# Patient Record
Sex: FEMALE | Race: BLACK OR AFRICAN AMERICAN | Employment: FULL TIME | URBAN - METROPOLITAN AREA
[De-identification: names, ages, dates, MRNs, and addresses within clinical notes are randomized per-mention and may not be internally consistent; named-entity substitution may affect disease eponyms.]

---

## 2019-06-11 PROBLEM — R50.9 FEVER: Status: ACTIVE | Noted: 2019-06-11

## 2019-06-11 PROBLEM — D72.819 LEUKOPENIA: Status: ACTIVE | Noted: 2019-06-11

## 2019-06-11 PROBLEM — D69.6 THROMBOCYTOPENIA (HCC): Status: ACTIVE | Noted: 2019-06-11

## 2019-06-11 PROBLEM — R74.01 ELEVATED TRANSAMINASE LEVEL: Status: ACTIVE | Noted: 2019-06-11

## 2019-08-07 ENCOUNTER — OFFICE VISIT (OUTPATIENT)
Dept: ONCOLOGY | Age: 44
End: 2019-08-07

## 2019-08-07 ENCOUNTER — HOSPITAL ENCOUNTER (OUTPATIENT)
Dept: ONCOLOGY | Age: 44
Discharge: HOME | End: 2019-08-07

## 2019-08-07 VITALS
DIASTOLIC BLOOD PRESSURE: 67 MMHG | HEIGHT: 69 IN | TEMPERATURE: 98.2 F | OXYGEN SATURATION: 98 % | RESPIRATION RATE: 18 BRPM | SYSTOLIC BLOOD PRESSURE: 97 MMHG | HEART RATE: 77 BPM | WEIGHT: 219 LBS | BODY MASS INDEX: 32.44 KG/M2

## 2019-08-07 DIAGNOSIS — D72.819 CHRONIC LEUKOPENIA: ICD-10-CM

## 2019-08-07 DIAGNOSIS — D50.9 IRON DEFICIENCY ANEMIA, UNSPECIFIED IRON DEFICIENCY ANEMIA TYPE: Chronic | ICD-10-CM

## 2019-08-07 DIAGNOSIS — D50.0 IRON DEFICIENCY ANEMIA DUE TO CHRONIC BLOOD LOSS: Primary | ICD-10-CM

## 2019-08-07 DIAGNOSIS — D61.818 PANCYTOPENIA (HCC): ICD-10-CM

## 2019-08-07 DIAGNOSIS — D69.6 THROMBOCYTOPENIA (HCC): ICD-10-CM

## 2019-08-07 LAB
BASOPHILS # BLD: 0 K/UL (ref 0–0.1)
BASOPHILS NFR BLD: 0 % (ref 0–2)
DIFFERENTIAL METHOD BLD: ABNORMAL
EOSINOPHIL # BLD: 0.1 K/UL (ref 0–0.4)
EOSINOPHIL NFR BLD: 2 % (ref 0–5)
ERYTHROCYTE [DISTWIDTH] IN BLOOD BY AUTOMATED COUNT: 12.9 % (ref 11.6–14.5)
HCT VFR BLD AUTO: 33.6 % (ref 35–45)
HGB BLD-MCNC: 11.4 G/DL (ref 12–16)
LYMPHOCYTES # BLD: 0.7 K/UL (ref 0.9–3.6)
LYMPHOCYTES NFR BLD: 21 % (ref 21–52)
MCH RBC QN AUTO: 28 PG (ref 24–34)
MCHC RBC AUTO-ENTMCNC: 33.9 G/DL (ref 31–37)
MCV RBC AUTO: 82.6 FL (ref 74–97)
MONOCYTES # BLD: 0.7 K/UL (ref 0.05–1.2)
MONOCYTES NFR BLD: 22 % (ref 3–10)
NEUTS SEG # BLD: 1.7 K/UL (ref 1.8–8)
NEUTS SEG NFR BLD: 55 % (ref 40–73)
PLATELET # BLD AUTO: 212 K/UL (ref 135–420)
PMV BLD AUTO: 10.6 FL (ref 9.2–11.8)
RBC # BLD AUTO: 4.07 M/UL (ref 4.2–5.3)
WBC # BLD AUTO: 3.1 K/UL (ref 4.6–13.2)

## 2019-08-07 NOTE — PROGRESS NOTES
Hematology/Oncology  Consultation Note    Name: Benjamín Ndiaye  Date: 2019  : 1975    PCP: Dr. Jani Kaplan    Ms. Vandana Oliveira is a 41 y.o.  female who was referred back to this clinic for further assessment of recently diagnosed pancytopenia donor hospitalization in 2018. She previously had been followed in this clinic for iron deficiency anemia. C  Subjective:     Ms. Vandana Oliveira is a 44-year-old -American woman who has a history of inflammatory bowel disease and iron deficiency anemia. She was last seen in this clinic in 2013. She states that she had been doing well until early 2018 when she developed fever and headaches. She also had pain in her upper extremities. In the course of her hospitalization she was found to have a WBC count of about 2.2 with a hemoglobin that declined to 10.2 g/dL with hematocrit of 31.6. Her platelet counts ranged between 81,000-92,000 during her recent hospital stay. Since she had not been seen in the hematology/oncology clinic in over 5 years she was referred back for further assessment of her pancytopenia. Today she is complaining of progressive fatigue and weakness. Currently she is not taking any over-the-counter iron supplements. She did have a lumbar puncture during her hospitalization and states that she remain out of work for almost a month following that due to recurrent headaches. She has no other complaints or concerns.      Past Medical History:   Diagnosis Date    Anemia NEC     iron transfusion by Dr. Robbins [place] disease ([address]     GERD (gastroesophageal reflux disease)     Headache(784.0)      Past Surgical History:   Procedure Laterality Date    HX OTHER SURGICAL      Ablation gyn    HX TUBAL LIGATION       Social History     Socioeconomic History    Marital status:      Spouse name: Not on file    Number of children: Not on file    Years of education: Not on file    Highest education level: Not on file Occupational History    Not on file   Social Needs    Financial resource strain: Not on file    Food insecurity:     Worry: Not on file     Inability: Not on file    Transportation needs:     Medical: Not on file     Non-medical: Not on file   Tobacco Use    Smoking status: Never Smoker    Smokeless tobacco: Never Used   Substance and Sexual Activity    Alcohol use: Not Currently     Comment: rare social    Drug use: No    Sexual activity: Yes     Birth control/protection: Surgical   Lifestyle    Physical activity:     Days per week: Not on file     Minutes per session: Not on file    Stress: Not on file   Relationships    Social connections:     Talks on phone: Not on file     Gets together: Not on file     Attends [employer] service: Not on file     Active member of club or organization: Not on file     Attends meetings of clubs or organizations: Not on file     Relationship status: Not on file    Intimate partner violence:     Fear of current or ex partner: Not on file     Emotionally abused: Not on file     Physically abused: Not on file     Forced sexual activity: Not on file   Other Topics Concern    Not on file   Social History Narrative    Not on file     Family History   Problem Relation Age of Onset    Diabetes Paternal Grandmother      Current Outpatient Medications   Medication Sig Dispense Refill    diclofenac (VOLTAREN) 1 % gel Apply 4 g to affected area four (4) times daily. 4 g 1    naproxen EC (NAPROSYN EC) 500 mg EC tablet Take 1 Tab by mouth two (2) times a day. 30 Tab 1    pantoprazole (PROTONIX) 40 mg tablet Take 1 Tab by mouth daily. 30 Tab 1    valACYclovir (VALTREX) 1 gram tablet Take 1,000 mg by mouth daily.  linaclotide (LINZESS) 290 mcg cap capsule Take 290 mcg by mouth daily as needed.          Review of Systems  General ROS:The patient has complaints of a mild to moderate degree of fatigue and some weakness  Psychological ROS: patient denies having any psychological symptoms such as hallucinations, depression or anxiety. Ophthalmic ROS:the patient denies having any visual impairment or eye discomfort. ENT ROS: there are no abnormalities reported. Allergy and Immunology ROS:the patient denies having any seasonal allergies or allergies to medications other than those already outlined above. Hematological and Lymphatic ROS: the patient denies having any bruising, bleeding or lymphadenopathy. Endocrine ROS: the patient denies having any heat or cold intolerance. There is no history of diabetes or thyroid disorders. Breast ROS: the patient denies having any history of breast mass, nipple discharge, or lumps. Respiratory ROS:the patient denies having any cough, shortness of breath, or dyspnea on exertion. Cardiovascular ROS: there are no complaints of chest pain, palpitations, chest pounding, or dyspnea on exertion. Gastrointestinal ROS: the patient denies having nausea, emesis, diarrhea, constipation, or blood in the stool. Genito-Urinary ROS: the patient denies having urinary urgency, frequency, or dysuria. Musculoskeletal ROS: with the exception of mild arthralgias the patient has no other musculoskeletal complaints. Neurological ROS: the patient denies having any numbness, tingling, or neurologic deficits. Dermatological ROS:patient denies having any unexplained rash, skin ulcerations, or hives. Objective:     Visit Vitals  BP 97/67   Pulse 77   Temp 98.2 °F (36.8 °C) (Oral)   Resp 18   Ht 5' 9\" (1.753 m)   Wt 99.3 kg (219 lb)   SpO2 98%   BMI 32.34 kg/m²       Physical Exam:   Gen. Appearance: The patient is in no acute distress. Skin: There is no bruise or rash. HEENT: The exam is unremarkable. Neck: Supple without lymphadenopathy or thyromegaly. Lungs: Clear to auscultation and percussion; there are no wheezes or rhonchi. Heart: Regular rate and rhythm; there are no murmurs, gallops, or rubs. Abdomen:  Bowel sounds are present and normal. There is no guarding, tenderness, or hepatosplenomegaly. Extremities: There is no clubbing, cyanosis, or edema. Neurologic: There are no focal neurologic deficits. Lymphatics: There is no palpable peripheral lymphadenopathy. Musculoskeletal: The patient has full range of motion at all joints. There is no joint tenderness or swelling.     Lab data:      Results for orders placed or performed during the hospital encounter of 06/11/19   CULTURE, BLOOD   Result Value Ref Range    Blood Culture Result No Growth at 5 days     CULTURE, BLOOD   Result Value Ref Range    Blood Culture Result No Growth at 5 days     CULTURE, CSF W GRAM STAIN   Result Value Ref Range    GRAM STAIN No WBC's Seen  No Organisms Seen        Culture result No Growth After 5 Days     CULTURE, URINE   Result Value Ref Range    Culture result No Growth     RESPIRATORY VIRAL PANEL, PCR   Result Value Ref Range    RSV RNA, Qualitative PCR NOT DETECTED      Source [place]      Influenza A RNA, PCR NOT DETECTED      Influenza B RNA, PCR NOT DETECTED      Parainfluenza 1 RNA NOT DETECTED      Parainfluenza 2 RNA NOT DETECTED      Parainfluenza 3 RNA NOT DETECTED      Parainfluenza 4 RNA NOT DETECTED      Adenovirus DNA, QL PCR NOT DETECTED      Human Metapneumovirus RNA, QL NOT DETECTED     CBC WITH AUTOMATED DIFF   Result Value Ref Range    WBC 2.2 (L) 4.0 - 11.0 1000/mm3    NEUTROPHILS 54.3 34 - 64 %    LYMPHOCYTES 25.7 (L) 28 - 48 %    RBC 4.50 3.60 - 5.20 M/uL    MONOCYTES 5.7 1 - 13 %    HGB 12.4 (L) 13.0 - 17.2 gm/dl    HCT 37.6 37.0 - 50.0 %    MCV 83.6 80.0 - 98.0 fL    BAND NEUTROPHILS 11.4 (H) 0 - 11 %    MCH 27.6 25.4 - 34.6 pg    MCHC 33.0 30.0 - 36.0 gm/dl    PLATELET 83 (L) 079 - 450 1000/mm3    UNIDENTIFIED MONONUCLEAR CELL 2.9 (H) 0 - 0 %    MPV 11.7 (H) 6.0 - 10.0 fL    RDW-SD 36.9 36.4 - 46.3      Poikilocytosis 1      NRBC 0 0 - 0      IMMATURE GRANULOCYTES 0.9 0.0 - 3.0 %    Elliptocytes 1      EOSINOPHILS 0.0 0 - 5 %    BASOPHILS 0.5 0 - 3 %    Smudge cells 13.3      Ovalocytes-DIF 1      PLATELET COMMENTS DECREASED      Large platelets FEW     METABOLIC PANEL, COMPREHENSIVE   Result Value Ref Range    Sodium 133 (L) 136 - 145 mEq/L    Potassium 3.9 3.5 - 5.1 mEq/L    Chloride 101 98 - 107 mEq/L    CO2 24 21 - 32 mEq/L    Glucose 96 74 - 106 mg/dl    BUN 10 7 - 25 mg/dl    Creatinine 0.8 0.6 - 1.3 mg/dl    GFR est AA >60.0      GFR est non-AA >60      Calcium 8.9 8.5 - 10.1 mg/dl    AST (SGOT) 92 (H) 15 - 37 U/L    ALT (SGPT) 46 12 - 78 U/L    Alk. phosphatase 92 45 - 117 U/L    Bilirubin, total 0.5 0.2 - 1.0 mg/dl    Protein, total 8.8 (H) 6.4 - 8.2 gm/dl    Albumin 3.9 3.4 - 5.0 gm/dl    Anion gap 9 5 - 15 mmol/L   LACTIC ACID   Result Value Ref Range    Lactic Acid 1.1 0.4 - 2.0 mmol/L   PROCALCITONIN   Result Value Ref Range    PROCALCITONIN 0.13 0.00 - 0.50 ng/ml   CELL COUNT, CSF   Result Value Ref Range    CSF TUBE NO. #1      CSF APPEARANCE CLEAR      CSF COLOR COLORLESS      CSF WBCS 1 0 - 10      CSF RBCS 0 0 - 10     CELL COUNT, CSF   Result Value Ref Range    CSF TUBE NO.  #4      CSF APPEARANCE CLEAR      CSF COLOR COLORLESS      CSF WBCS 0 0 - 10      CSF RBCS 0 0 - 10     GLUCOSE, CSF   Result Value Ref Range    Glucose,CSF 52 40 - 70 mg/dl   PROTEIN, CSF   Result Value Ref Range    Protein,CSF 38.9 15.0 - 45.0 mg/dl   HSV 1/2 PCR, CSF   Result Value Ref Range    Source CSF      HSV 1 DNA Not Detected Not Detected      HSV 2 DNA Not Detected Not Detected     LACTIC ACID   Result Value Ref Range    Lactic Acid 1.0 0.4 - 2.0 mmol/L   LACTIC ACID   Result Value Ref Range    Lactic Acid 1.2 0.4 - 2.0 mmol/L   CBC WITH AUTOMATED DIFF   Result Value Ref Range    WBC 1.6 (L) 4.0 - 11.0 1000/mm3    NEUTROPHILS 65.4 (H) 34 - 64 %    LYMPHOCYTES 24.0 (L) 28 - 48 %    RBC 3.74 3.60 - 5.20 M/uL    MONOCYTES 3.8 1 - 13 %    HGB 10.5 (L) 13.0 - 17.2 gm/dl    HCT 31.6 (L) 37.0 - 50.0 %    MCV 84.5 80.0 - 98.0 fL    BAND NEUTROPHILS 4.8 0 - 11 %    MCH 28.1 25.4 - 34.6 pg    METAMYELOCYTES 1.0 (H) 0 - 0 %    MCHC 33.2 30.0 - 36.0 gm/dl    MYELOCYTES 1.0 (H) 0 - 0 %    PLATELET 73 (L) 966 - 450 1000/mm3    MPV 11.4 (H) 6.0 - 10.0 fL    RDW-SD 37.7 36.4 - 46.3      Poikilocytosis 1      NRBC 0 0 - 0      IMMATURE GRANULOCYTES 0.6 0.0 - 3.0 %    Elliptocytes 2      Smudge cells 21.2      Giant platelets 6.7      Crenated RBCs 1      PLATELET COMMENTS DECREASED     METABOLIC PANEL, BASIC   Result Value Ref Range    Sodium 139 136 - 145 mEq/L    Potassium 3.7 3.5 - 5.1 mEq/L    Chloride 110 (H) 98 - 107 mEq/L    CO2 22 21 - 32 mEq/L    Glucose 114 (H) 74 - 106 mg/dl    BUN 10 7 - 25 mg/dl    Creatinine 0.7 0.6 - 1.3 mg/dl    GFR est AA >60.0      GFR est non-AA >60      Calcium 7.8 (L) 8.5 - 10.1 mg/dl    Anion gap 7 5 - 15 mmol/L   LD   Result Value Ref Range     (H) 84 - 246 U/L   PROTHROMBIN TIME + INR   Result Value Ref Range    Prothrombin time 12.8 10.2 - 12.9 seconds    INR 1.1 0.1 - 1.1     LACTIC ACID   Result Value Ref Range    Lactic Acid 1.1 0.4 - 2.0 mmol/L   LD   Result Value Ref Range     (H) 84 - 246 U/L   BILIRUBIN, TOTAL   Result Value Ref Range    Bilirubin, total 0.3 0.2 - 1.0 mg/dl   COMPLEMENT, C3   Result Value Ref Range    Complement C3 160 83 - 193 mg/dL   COMPLEMENT, C4   Result Value Ref Range    COMPLEMENT,C4 32 15 - 57 mg/dL   SED RATE (ESR)   Result Value Ref Range    Sed rate (ESR) 16 0 - 20 mm/Hr   RHEUMATOID FACTOR, QL   Result Value Ref Range    Rheumatoid factor <10 0 - 14 IU/ml   ANTINUCLEAR ANTIBODIES, IFA   Result Value Ref Range    Antinuclear Abs, IFA Positive (A) Negative      Additional Testing Has been added      TANNER Titer 1:80 (A) Negative      TANNER Pattern Speckled     HAPTOGLOBIN   Result Value Ref Range    Haptoglobin 23 (L) 43 - 212 mg/dL   CK   Result Value Ref Range     26 - 192 U/L   TSH 3RD GENERATION   Result Value Ref Range    TSH 2.600 0.358 - 3.740 uIU/mL   T4, FREE   Result Value Ref Range    Free T4 1.24 0.76 - 1.46 ng/dl   CBC WITH AUTOMATED DIFF   Result Value Ref Range    WBC 2.0 (L) 4.0 - 11.0 1000/mm3    NEUTROPHILS 83.6 (H) 34 - 64 %    LYMPHOCYTES 10.6 (L) 28 - 48 %    RBC 3.69 3.60 - 5.20 M/uL    MONOCYTES 4.8 1 - 13 %    HGB 10.4 (L) 13.0 - 17.2 gm/dl    HCT 30.7 (L) 37.0 - 50.0 %    MCV 83.2 80.0 - 98.0 fL    MCH 28.2 25.4 - 34.6 pg    METAMYELOCYTES 1.0 (H) 0 - 0 %    MCHC 33.9 30.0 - 36.0 gm/dl    PLATELET 66 (L) 888 - 450 1000/mm3    MPV 10.6 (H) 6.0 - 10.0 fL    RDW-SD 37.9 36.4 - 46.3      Poikilocytosis 2      Anisocytosis 0      IMMATURE GRANULOCYTES 0.5 0.0 - 3.0 %    HYPOCHROMASIA 0      Polychromasia 0      Microcytes 0      Macrocytes 0      Elliptocytes 1      Basophilic stippling 1      EOSINOPHILS 0.5 0 - 5 %    BASOPHILS 0.5 0 - 3 %    Smudge cells 12.5      Giant platelets 6.7      NRBC 1      PLATELET COMMENTS NORMAL     PARVOVIRUS B-19 BY PCR   Result Value Ref Range    Parvovirus B19 by PCR <100 <100 copies/mL    Source Whole Blood     PARVOVIRUS B19 AB   Result Value Ref Range    Parvovirus B19 Ab, IgG 0.1 <0.9      Parvovirus B19 Ab, IgM 3.3 (H) <0.9     HEP B SURFACE AB   Result Value Ref Range    Hepatitis B surface Ab NON-REACTIVE     HEP B SURFACE AG   Result Value Ref Range    Hepatitis B surface Ag NON-REACTIVE NON-REACTIVE     HEPATITIS B CORE AB, IGM   Result Value Ref Range    Hepatitis B core, IgM NON-REACTIVE     HEPATITIS C AB   Result Value Ref Range    Hepatitis C virus Ab NON-REACTIVE NON-REACTIVE      Signal to Cutoff (Hep C) 0     CBC WITH AUTOMATED DIFF   Result Value Ref Range    WBC 2.1 (L) 4.0 - 11.0 1000/mm3    NEUTROPHILS 64.4 (H) 34 - 64 %    LYMPHOCYTES 26.0 (L) 28 - 48 %    RBC 3.77 3.60 - 5.20 M/uL    MONOCYTES 3.8 1 - 13 %    HGB 10.8 (L) 13.0 - 17.2 gm/dl    EOSINOPHILS 2.9 0 - 5 %    HCT 31.6 (L) 37.0 - 50.0 %    BASOPHILS 0.9 0 - 3 %    MCV 83.8 80.0 - 98.0 fL    BAND NEUTROPHILS 1.0 0 - 11 %    MCH 28.6 25.4 - 34.6 pg    MCHC 34.2 30.0 - 36.0 gm/dl    PLATELET 81 (L) 988 - 450 1000/mm3    MPV 11.3 (H) 6.0 - 10.0 fL    ATYPICAL LYMPHS 1.0 (H) 0 - 0 %    RDW-SD 38.4 36.4 - 46.3      Poikilocytosis 2      NRBC 0 0 - 0      Anisocytosis 1      Elliptocytes 2      Smudge cells 8.7      PLATELET COMMENTS DECREASED      Giant platelets MODERATE     METABOLIC PANEL, COMPREHENSIVE   Result Value Ref Range    Sodium 138 136 - 145 mEq/L    Potassium 4.0 3.5 - 5.1 mEq/L    Chloride 108 (H) 98 - 107 mEq/L    CO2 23 21 - 32 mEq/L    Glucose 81 74 - 106 mg/dl    BUN 7 7 - 25 mg/dl    Creatinine 0.7 0.6 - 1.3 mg/dl    GFR est AA >60.0      GFR est non-AA >60      Calcium 8.2 (L) 8.5 - 10.1 mg/dl    AST (SGOT) 56 (H) 15 - 37 U/L    ALT (SGPT) 30 12 - 78 U/L    Alk.  phosphatase 70 45 - 117 U/L    Bilirubin, total 0.5 0.2 - 1.0 mg/dl    Protein, total 7.0 6.4 - 8.2 gm/dl    Albumin 3.1 (L) 3.4 - 5.0 gm/dl    Anion gap 7 5 - 15 mmol/L   TSH 3RD GENERATION   Result Value Ref Range    TSH 3.390 0.358 - 3.740 uIU/mL   SED RATE (ESR)   Result Value Ref Range    Sed rate (ESR) 15 0 - 20 mm/Hr   C REACTIVE PROTEIN, QT   Result Value Ref Range    C-Reactive protein 28.4 (H) 0.0 - 2.9 mg/L   FERRITIN   Result Value Ref Range    Ferritin 851.3 (H) 8.0 - 252.0 ng/ml   HCG URINE, QL   Result Value Ref Range    HCG urine, QL NEGATIVE NEGATIVE     CBC WITH AUTOMATED DIFF   Result Value Ref Range    WBC 2.2 (L) 4.0 - 11.0 1000/mm3    RBC 3.75 3.60 - 5.20 M/uL    HGB 10.2 (L) 13.0 - 17.2 gm/dl    HCT 31.6 (L) 37.0 - 50.0 %    MCV 84.3 80.0 - 98.0 fL    MCH 27.2 25.4 - 34.6 pg    MCHC 32.3 30.0 - 36.0 gm/dl    PLATELET 92 (L) 350 - 450 1000/mm3    MPV 11.0 (H) 6.0 - 10.0 fL    RDW-SD 37.8 36.4 - 46.3      NRBC 0 0 - 0      IMMATURE GRANULOCYTES 0.9 0.0 - 3.0 %    NEUTROPHILS 42.5 34 - 64 %    LYMPHOCYTES 42.5 28 - 48 %    MONOCYTES 11.8 1 - 13 %    EOSINOPHILS 1.8 0 - 5 %    BASOPHILS 0.5 0 - 3 %   METABOLIC PANEL, COMPREHENSIVE   Result Value Ref Range    Sodium 141 136 - 145 mEq/L    Potassium 3.8 3.5 - 5.1 mEq/L    Chloride 111 (H) 98 - 107 mEq/L    CO2 25 21 - 32 mEq/L    Glucose 87 74 - 106 mg/dl    BUN 9 7 - 25 mg/dl    Creatinine 0.6 0.6 - 1.3 mg/dl    GFR est AA >60.0      GFR est non-AA >60      Calcium 8.6 8.5 - 10.1 mg/dl    AST (SGOT) 50 (H) 15 - 37 U/L    ALT (SGPT) 30 12 - 78 U/L    Alk. phosphatase 63 45 - 117 U/L    Bilirubin, total 0.7 0.2 - 1.0 mg/dl    Protein, total 6.8 6.4 - 8.2 gm/dl    Albumin 3.2 (L) 3.4 - 5.0 gm/dl    Anion gap 5 5 - 15 mmol/L   TANNER, TITER AND PATTERN   Result Value Ref Range    TANNER Titer 1:80 (A) Negative      TANNER Pattern Speckled     POC URINE MACROSCOPIC   Result Value Ref Range    Glucose Negative NEGATIVE,Negative mg/dl    Bilirubin Negative NEGATIVE,Negative      Ketone Trace (A) NEGATIVE,Negative mg/dl    Specific gravity >=1.030 1.005 - 1.030      Blood Trace-intact (A) NEGATIVE,Negative      pH (UA) 6.0 5 - 9      Protein 100 (A) NEGATIVE,Negative mg/dl    Urobilinogen 0.2 0.0 - 1.0 EU/dl    Nitrites Negative NEGATIVE,Negative      Leukocyte Esterase Negative NEGATIVE,Negative      Color Dark yellow      Appearance Slightly Cloudy              Assessment:     1. Iron deficiency anemia due to chronic blood loss    2. Iron deficiency anemia, unspecified iron deficiency anemia type    3. Pancytopenia ([address]    4. Thrombocytopenia ([address]    5. Chronic leukopenia        Plan:   Pancytopenia (anemia, thrombocytopenia and leukopenia)  Iron deficiency anemia due to chronic blood loss/anemia of unclear etiology: At this time I will check the iron profile and ferritin levels. If the ferritin level is below 25 ng/mL she will be offered therapeutic intervention with intravenous iron in the form of Injectafer. Thrombocytopenia: The platelet counts were significantly low during hospitalization. I will check a CBC today.   If the platelet counts decline below 30,000 therapeutic intervention with high-dose steroids and gammaglobulin will be recommended. Chronic leukopenia: The flow cytometry/immunophenotyping profile will be obtained to assess for any immunophenotypic aberrancy in the leukocyte populations. Therapeutic intervention will depend on the findings. If there is immunophenotypic aberrancy a bone marrow biopsy will be indicated. Follow-up in 2 weeks to review lab data. Orders Placed This Encounter    COMPLETE CBC & AUTO DIFF WBC    InHouse CBC ([employer])     Standing Status:   Future     Number of Occurrences:   1     Standing Expiration Date:   5/19/8894    METABOLIC PANEL, COMPREHENSIVE     Standing Status:   Future     Standing Expiration Date:   8/7/2020    IRON PROFILE     Standing Status:   Future     Standing Expiration Date:   8/7/2020    FERRITIN     Standing Status:   Future     Standing Expiration Date:   8/7/2020    VITAMIN B12 & FOLATE     Standing Status:   Future     Standing Expiration Date:   8/7/2020    ERYTHROPOIETIN     Standing Status:   Future     Standing Expiration Date:   8/7/2020    RETICULOCYTE COUNT     Standing Status:   Future     Standing Expiration Date:   8/7/2020    IMMUNOPHENOTYPING PROFILE     Standing Status:   Future     Standing Expiration Date:   8/7/2020     Order Specific Question:   Specimen source     Answer:   Blood [2]       Laura Valentine MD  8/7/2019      I have assessed the patient independently and  agree with the full assessment as outlined.   Mandy Garcia MD, [facility]

## 2019-08-13 LAB
ALBUMIN SERPL-MCNC: 4.7 G/DL (ref 3.5–5.5)
ALBUMIN/GLOB SERPL: 1.3 {RATIO} (ref 1.2–2.2)
ALP SERPL-CCNC: 60 IU/L (ref 39–117)
ALT SERPL-CCNC: 11 IU/L (ref 0–32)
ANALYSIS AND GATING STRATEGY: NORMAL
ASSESSMENT OF LEUKOCYTES: NORMAL
AST SERPL-CCNC: 18 IU/L (ref 0–40)
BILIRUB SERPL-MCNC: <0.2 MG/DL (ref 0–1.2)
BUN SERPL-MCNC: 13 MG/DL (ref 6–24)
BUN/CREAT SERPL: 19 (ref 9–23)
CALCIUM SERPL-MCNC: 9.6 MG/DL (ref 8.7–10.2)
CHLORIDE SERPL-SCNC: 104 MMOL/L (ref 96–106)
CLINICAL INFO: NORMAL
CO2 SERPL-SCNC: 20 MMOL/L (ref 20–29)
COMMENT , 490046: NORMAL
CREAT SERPL-MCNC: 0.68 MG/DL (ref 0.57–1)
EPO SERPL-ACNC: 5.5 MIU/ML (ref 2.6–18.5)
FERRITIN SERPL-MCNC: 135 NG/ML (ref 15–150)
FOLATE SERPL-MCNC: 9.7 NG/ML
GLOBULIN SER CALC-MCNC: 3.7 G/DL (ref 1.5–4.5)
GLUCOSE SERPL-MCNC: 78 MG/DL (ref 65–99)
IMMUNOPHENOTYPING STUDY: NORMAL
IRON SATN MFR SERPL: 17 % (ref 15–55)
IRON SERPL-MCNC: 49 UG/DL (ref 27–159)
PATH INTERP SPEC-IMP: NORMAL
PATHOLOGIST NAME: NORMAL
POTASSIUM SERPL-SCNC: 4.8 MMOL/L (ref 3.5–5.2)
PROT SERPL-MCNC: 8.4 G/DL (ref 6–8.5)
RETICS/RBC NFR AUTO: 2.4 % (ref 0.6–2.6)
SODIUM SERPL-SCNC: 141 MMOL/L (ref 134–144)
SPECIMEN SOURCE: NORMAL
TIBC SERPL-MCNC: 296 UG/DL (ref 250–450)
UIBC SERPL-MCNC: 247 UG/DL (ref 131–425)
VIABLE CELLS NFR SPEC: NORMAL %
VIT B12 SERPL-MCNC: 357 PG/ML (ref 232–1245)

## 2019-08-28 ENCOUNTER — OFFICE VISIT (OUTPATIENT)
Dept: ONCOLOGY | Age: 44
End: 2019-08-28

## 2019-08-28 VITALS
TEMPERATURE: 97.9 F | OXYGEN SATURATION: 99 % | RESPIRATION RATE: 12 BRPM | WEIGHT: 221.6 LBS | HEIGHT: 69 IN | HEART RATE: 85 BPM | BODY MASS INDEX: 32.82 KG/M2

## 2019-08-28 DIAGNOSIS — D61.818 PANCYTOPENIA (HCC): ICD-10-CM

## 2019-08-28 DIAGNOSIS — D50.9 IRON DEFICIENCY ANEMIA, UNSPECIFIED IRON DEFICIENCY ANEMIA TYPE: Primary | ICD-10-CM

## 2019-08-28 NOTE — PROGRESS NOTES
ROOM # 2    Juvenal Suresh presents today for   Chief Complaint   Patient presents with    Results       Juvenal Suresh preferred language for health care discussion is english/other. Is someone accompanying this pt? no    Is the patient using any DME equipment during [address]? no    Depression Screening:  3 most recent PHQ Screens 8/8/2019   Little interest or pleasure in doing things Not at all   Feeling down, depressed, irritable, or hopeless Not at all   Total Score PHQ 2 0       Learning Assessment:  No flowsheet data found. Abuse Screening:  No flowsheet data found. Fall Risk  No flowsheet data found. Health Maintenance reviewed and discussed per provider. Yes    Juvenal Suresh is due for   Health Maintenance Due   Topic Date Due    DTaP/Tdap/Td series (1 - Tdap) 03/27/2004    PAP AKA CERVICAL CYTOLOGY  07/29/2011    COLONOSCOPY  03/06/2019    Influenza Age 9 to Adult  08/01/2019         Please order/place referral if appropriate. Advance Directive:  1. Do you have an advance directive in place? Patient Reply: no    2. If not, would you like material regarding how to put one in place? Patient Reply: no    Coordination of Care:  1. Have you been to the ER, urgent care clinic since your last visit? Hospitalized since your last visit? no    2. Have you seen or consulted any other health care providers outside of the [address] [place] [address] since your last visit? Include any pap smears or colon screening.  no

## 2019-08-28 NOTE — PROGRESS NOTES
Hematology/Oncology  Progress Note    Name: Claudia Muir  Date: 2019  : 1975    PCP: Dr. Yosi Mayorga    Ms. Santana Harris is a 33-year-old -American woman who has a history of inflammatory bowel disease and iron deficiency anemia. Her platelet counts ranged between 81,000-92,000 during her recent hospital stay. Since she had not been seen in the hematology/oncology clinic in over 5 years she was referred back for further assessment of her pancytopenia. Workup was done at the time of consultation. Iron Profile, Ferritin level, EPO, B12/Folate, CMP, and Flow Cytometry were ordered. Today she is here to discuss the results of these tests. The results showed that her Ferritin level was normal at 135ng/mL, Her iron level was normal at 49ug/mL with a normal saturation of 17%. Her EPO level was also normal at 5.5mIU/mL, Her Vitamin B12 was also normal at 357pg/mL with a normal folate at 9.7ng/mL. Her BUN and creatinine were normal as well. Flow cytometry showed no significant immunophenotypic abnormality detected. Based on these results no intervention is warranted. We will continue to monitor every 2 months. All her questions were answered to her satisfaction. Total time 25 minutes, greater than 50% of the time was in counseling and coordination of care. Follow up in 2 months or sooner if indicated. ADELINA Ingram  2019    I have assessed the patient independently and  agree with the full assessment as outlined.   Alondra Ignacio MD, [place]

## 2020-01-29 ENCOUNTER — IMPORTED ENCOUNTER (OUTPATIENT)
Dept: URBAN - METROPOLITAN AREA CLINIC 1 | Facility: CLINIC | Age: 45
End: 2020-01-29

## 2020-01-29 PROBLEM — H40.023: Noted: 2020-01-29

## 2020-01-29 PROCEDURE — 92250 FUNDUS PHOTOGRAPHY W/I&R: CPT

## 2020-01-29 PROCEDURE — 92015 DETERMINE REFRACTIVE STATE: CPT

## 2020-01-29 PROCEDURE — 92004 COMPRE OPH EXAM NEW PT 1/>: CPT

## 2020-01-29 NOTE — PATIENT DISCUSSION
1.  Headaches- Negative papilledema on today's exam. No ocular concerns for the headaches. Pt to continue to follow up with her PCP. 2.  Glaucoma Suspect OU (CD 0.80/0.65) : IOP 16/15 today. FH mother. Patient is considered high risk. Disc photos done today show optic nerve cupping OU. Condition was discussed with patient and patient understands. Will continue to monitor patient for any progression in condition. Patient was advised to call us with any problems questions or concerns. Return for an appointment in 1 month 10/OCT/ Vf 24-2 with Dr. Kristen Burciaga.

## 2020-01-30 ENCOUNTER — IMPORTED ENCOUNTER (OUTPATIENT)
Dept: URBAN - METROPOLITAN AREA CLINIC 1 | Facility: CLINIC | Age: 45
End: 2020-01-30

## 2020-01-30 ENCOUNTER — OFFICE VISIT (OUTPATIENT)
Dept: ONCOLOGY | Age: 45
End: 2020-01-30

## 2020-01-30 ENCOUNTER — HOSPITAL ENCOUNTER (OUTPATIENT)
Dept: ONCOLOGY | Age: 45
Discharge: HOME | End: 2020-01-30

## 2020-01-30 VITALS
DIASTOLIC BLOOD PRESSURE: 69 MMHG | BODY MASS INDEX: 34.13 KG/M2 | HEIGHT: 69 IN | HEART RATE: 82 BPM | SYSTOLIC BLOOD PRESSURE: 104 MMHG | TEMPERATURE: 98.2 F | WEIGHT: 230.4 LBS | OXYGEN SATURATION: 99 % | RESPIRATION RATE: 16 BRPM

## 2020-01-30 DIAGNOSIS — D61.818 PANCYTOPENIA (HCC): ICD-10-CM

## 2020-01-30 DIAGNOSIS — D50.9 IRON DEFICIENCY ANEMIA, UNSPECIFIED IRON DEFICIENCY ANEMIA TYPE: Primary | ICD-10-CM

## 2020-01-30 DIAGNOSIS — D50.9 IRON DEFICIENCY ANEMIA, UNSPECIFIED IRON DEFICIENCY ANEMIA TYPE: ICD-10-CM

## 2020-01-30 PROBLEM — H10.021: Noted: 2020-01-30

## 2020-01-30 LAB
BASO+EOS+MONOS # BLD AUTO: 0.6 K/UL (ref 0–2.3)
BASO+EOS+MONOS NFR BLD AUTO: 24 % (ref 0.1–17)
DIFFERENTIAL METHOD BLD: ABNORMAL
ERYTHROCYTE [DISTWIDTH] IN BLOOD BY AUTOMATED COUNT: 12 % (ref 11.5–14.5)
HCT VFR BLD AUTO: 34.5 % (ref 36–48)
HGB BLD-MCNC: 11.7 G/DL (ref 12–16)
LYMPHOCYTES # BLD: 0.9 K/UL (ref 1.1–5.9)
LYMPHOCYTES NFR BLD: 34 % (ref 14–44)
MCH RBC QN AUTO: 28.6 PG (ref 25–35)
MCHC RBC AUTO-ENTMCNC: 33.9 G/DL (ref 31–37)
MCV RBC AUTO: 84.4 FL (ref 78–102)
NEUTS SEG # BLD: 1 K/UL (ref 1.8–9.5)
NEUTS SEG NFR BLD: 42 % (ref 40–70)
PLATELET # BLD AUTO: 201 K/UL (ref 140–440)
RBC # BLD AUTO: 4.09 M/UL (ref 4.1–5.1)
WBC # BLD AUTO: 2.5 K/UL (ref 4.5–13)

## 2020-01-30 PROCEDURE — 92012 INTRM OPH EXAM EST PATIENT: CPT

## 2020-01-30 NOTE — PATIENT INSTRUCTIONS
Complete Blood Count (CBC): About This Test 
What is it? A complete blood count (CBC) is a blood test that gives important information about your blood cells, especially red blood cells, white blood cells, and platelets. Why is this test done? A CBC may be done as part of a regular physical exam. There are many other reasons that a doctor may want this blood test, including to: · Find the cause of symptoms such as fatigue, weakness, fever, bruising, or weight loss. · Find anemia or an infection. · See how much blood has been lost if there is bleeding. · Diagnose diseases of the blood, such as leukemia or polycythemia. How can you prepare for the test? 
You do not need to do anything before having this test. 
What happens during the test? 
The health professional taking a sample of your blood will: · Wrap an elastic band around your upper arm. This makes the veins below the band larger so it is easier to put a needle into the vein. · Clean the needle site with alcohol. · Put the needle into the vein. · Attach a tube to the needle to fill it with blood. · Remove the band from your arm when enough blood is collected. · Put a gauze pad or cotton ball over the needle site as the needle is removed. · Put pressure on the site and then put on a bandage. If this blood test is done on a baby, a heel stick may be done instead of a blood draw from a vein. What happens after the test? 
· You will probably be able to go home right away. · You can go back to your usual activities right away. Follow-up care is a key part of your treatment and safety. Be sure to make and go to all appointments, and call your doctor if you are having problems. It's also a good idea to keep a list of the medicines you take. Ask your doctor when you can expect to have your test results. Where can you learn more? Go to http://destini-blue.info/. Enter L407 in the search box to learn more about \"Complete Blood Count (CBC): About This Test.\" Current as of: March 28, 2019 Content Version: 12.2 © 9466-9190 [employer]. Care instructions adapted under license by [employer] (which disclaims liability or warranty for this information). If you have questions about a medical condition or this instruction, always ask your healthcare professional. [place] [address] any warranty or liability for your use of this information.

## 2020-01-30 NOTE — PROGRESS NOTES
Hematology/Oncology  Progress Note    Name: Alda Ngo  Date: 2020  : 1975    Elham Gray MD     Ms. Sean Anne is a 45y.o. year old female who was seen for pancytopenia and iron deficiency anemia. She has a history of inflammatory bowel disease. Subjective:     Ms. Sean Anne is a 45 y.o. female who was seen for pancytopenia and iron deficiency anemia. She has a history of inflammatory bowel disease. She is here today for a follow up. She denies fatigue and weakness. She said she feels tired sometimes. She denies having fever, night sweat, skin rashes and lymphadenopathy. She denies bleeding or bruising. She has no physical complaints at this time. Past medical history, family history, and social history: these were reviewed and remains unchanged.     Past Medical History:   Diagnosis Date    Anemia NEC     iron transfusion by Dr. Toussaint Segundo disease ([facility] [address]     GERD (gastroesophageal reflux disease)     Headache(784.0)      Past Surgical History:   Procedure Laterality Date    HX OTHER SURGICAL      Ablation gyn    HX TUBAL LIGATION       Social History     Socioeconomic History    Marital status:      Spouse name: Not on file    Number of children: Not on file    Years of education: Not on file    Highest education level: Not on file   Occupational History    Not on file   Social Needs    Financial resource strain: Not on file    Food insecurity:     Worry: Not on file     Inability: Not on file    Transportation needs:     Medical: Not on file     Non-medical: Not on file   Tobacco Use    Smoking status: Never Smoker    Smokeless tobacco: Never Used   Substance and Sexual Activity    Alcohol use: Not Currently     Comment: rare social    Drug use: No    Sexual activity: Yes     Birth control/protection: Surgical   Lifestyle    Physical activity:     Days per week: Not on file     Minutes per session: Not on file    Stress: Not on file   Relationships    Social connections:     Talks on phone: Not on file     Gets together: Not on file     Attends [employer] service: Not on file     Active member of club or organization: Not on file     Attends meetings of clubs or organizations: Not on file     Relationship status: Not on file    Intimate partner violence:     Fear of current or ex partner: Not on file     Emotionally abused: Not on file     Physically abused: Not on file     Forced sexual activity: Not on file   Other Topics Concern    Not on file   Social History Narrative    Not on file     Family History   Problem Relation Age of Onset    Diabetes Paternal Grandmother     No Known Problems Mother     No Known Problems Father      Current Outpatient Medications   Medication Sig Dispense Refill    diclofenac (VOLTAREN) 1 % gel Apply 4 g to affected area four (4) times daily. 4 g 1    naproxen EC (NAPROSYN EC) 500 mg EC tablet Take 1 Tab by mouth two (2) times a day. 30 Tab 1    pantoprazole (PROTONIX) 40 mg tablet Take 1 Tab by mouth daily. 30 Tab 1    valACYclovir (VALTREX) 1 gram tablet Take 1,000 mg by mouth daily.  linaclotide (LINZESS) 290 mcg cap capsule Take 290 mcg by mouth daily as needed. Review of Systems  Constitutional: The patient has no acute distress or discomfort. HEENT: The patient denies recent head trauma, eye pain, blurred vision,  hearing deficit, oropharyngeal mucosal pain or lesions, and the patient denies throat pain or discomfort. Lymphatics: The patient denies palpable peripheral lymphadenopathy. Hematologic: The patient denies having bruising, bleeding, or progressive fatigue. Respiratory: Patient denies having shortness of breath, cough, sputum production, fever, or dyspnea on exertion. Cardiovascular: The patient denies having leg pain, leg swelling, heart palpitations, chest permit, chest pain, or lightheadedness. The patient denies having dyspnea on exertion.   Gastrointestinal: The patient denies having nausea, emesis, or diarrhea. The patient denies having any hematemesis or blood in the stool. Genitourinary: Patient denies having urinary urgency, frequency, or dysuria. The patient denies having blood in the urine. Psychological: The patient denies having symptoms of nervousness, anxiety, depression, or thoughts of harming self. Skin: Patient denies having skin rashes, skin, ulcerations, or unexplained itching or pruritus. Musculoskeletal: The patient denies having pain in the joints or bones. Objective:     Visit Vitals  /69   Pulse 82   Temp 98.2 °F (36.8 °C) (Oral)   Resp 16   Ht 5' 9\" (1.753 m)   Wt 104.5 kg (230 lb 6.4 oz)   SpO2 99%   BMI 34.02 kg/m²     ECOG PS=0  Physical Exam:   Gen. Appearance: The patient is in no acute distress. Skin: There is no bruise or rash. HEENT: The exam is unremarkable. Neck: Supple without lymphadenopathy or thyromegaly. Lungs: Clear to auscultation and percussion; there are no wheezes or rhonchi. Heart: Regular rate and rhythm; there are no murmurs, gallops, or rubs. Abdomen: Bowel sounds are present and normal.  There is no guarding, tenderness, or hepatosplenomegaly. Extremities: There is no clubbing, cyanosis, or edema. Neurologic: There are no focal neurologic deficits. Lymphatics: There is no palpable peripheral lymphadenopathy. Musculoskeletal: The patient has full range of motion at all joints. There is no evidence of joint deformity or effusions. There is no focal joint tenderness. Psychological/psychiatric: There is no clinical evidence of anxiety, depression, or melancholy.     Lab data:      Results for orders placed or performed during the hospital encounter of 01/30/20   CBC WITH 3 PART DIFF     Status: Abnormal   Result Value Ref Range Status    WBC 2.5 (L) 4.5 - 13.0 K/uL Final    RBC 4.09 (L) 4.10 - 5.10 M/uL Final    HGB 11.7 (L) 12.0 - 16.0 g/dL Final    HCT 34.5 (L) 36 - 48 % Final    MCV 84.4 78 - 102 FL Final    MCH 28.6 25.0 - 35.0 PG Final    MCHC 33.9 31 - 37 g/dL Final    RDW 12.0 11.5 - 14.5 % Final    PLATELET 515 556 - 380 K/uL Final    NEUTROPHILS 42 40 - 70 % Final    MIXED CELLS 24 (H) 0.1 - 17 % Final    LYMPHOCYTES 34 14 - 44 % Final    ABS. NEUTROPHILS 1.0 (L) 1.8 - 9.5 K/UL Final    ABS. MIXED CELLS 0.6 0.0 - 2.3 K/uL Final    ABS. LYMPHOCYTES 0.9 (L) 1.1 - 5.9 K/UL Final     Comment: Test performed at [address] [place] [address] or Outpatient Infusion Center Location. Reviewed by Medical Director. DF AUTOMATED   Final           Assessment:     1. Iron deficiency anemia, unspecified iron deficiency anemia type    2. Pancytopenia ([address]          Plan:     Iron deficiency anemia: I explained to the patient that her CBC today shows a hemoglobin of 11.7 g/dL with hematocrit of 34.5 %, platelet count is normal at 201,000. Her Ferritin level on 8/7/2019 was 135 ng/mL, iron level of 49 mcg/dL with iron saturation of 17%. Oral iron supplementation daily was recommended. I explained to the patient that CMP, iron profile and Ferritin level will be repeated today. Iron infusion will be ordered as needed. Pancytopenia: I explained to the patient that her CBC today shows a low WBC of 2.5 K/uL. Neutrophil count is 42%, with absolute neutrophil count of 1.0 K/UL. Lymphocyte count of 34% with absolute lymphocyte count of 0.9 K/UL. At this point there is no intervention, unless her ANC goes below 0.5 K/UL. I told her to notify us if she has signs and symptoms of infection such as fever and chills. Infection prevention discussed with patient. Platelet count is normal at 201,000. Bone marrow biopsy will be recommended if she continues to have pancytopenia. We will continue to monitor at 2 month intervals but patient refused to come  for reassessment every 2 months. She wants to come in 4 months, she said she will call if she needs to come sooner. Follow up in 4 months or sooner if indicated.     Orders Placed This Encounter    COMPLETE CBC & AUTO DIFF WBC    InHouse CBC (Live[employer])     Standing Status:   Future     Number of Occurrences:   1     Standing Expiration Date:   2/6/2020    IRON PROFILE     Standing Status:   Future     Number of Occurrences:   1     Standing Expiration Date:   3/71/0216    METABOLIC PANEL, COMPREHENSIVE     Standing Status:   Future     Number of Occurrences:   1     Standing Expiration Date:   1/30/2021    FERRITIN     Standing Status:   Future     Number of Occurrences:   1     Standing Expiration Date:   1/30/2021       Miguel Angel Ireland NP  1/30/2020     I have assessed the patient independently and  agree with the full assessment as outlined. Julian Gomez MD, FACP      Please note: This document has been produced using voice recognition software. Unrecognized errors in transcription may be present.

## 2020-01-31 LAB
ALBUMIN SERPL-MCNC: 4.3 G/DL (ref 3.8–4.8)
ALBUMIN/GLOB SERPL: 1.3 {RATIO} (ref 1.2–2.2)
ALP SERPL-CCNC: 57 IU/L (ref 39–117)
ALT SERPL-CCNC: 25 IU/L (ref 0–32)
AST SERPL-CCNC: 29 IU/L (ref 0–40)
BILIRUB SERPL-MCNC: 0.3 MG/DL (ref 0–1.2)
BUN SERPL-MCNC: 9 MG/DL (ref 6–24)
BUN/CREAT SERPL: 15 (ref 9–23)
CALCIUM SERPL-MCNC: 8.9 MG/DL (ref 8.7–10.2)
CHLORIDE SERPL-SCNC: 107 MMOL/L (ref 96–106)
CO2 SERPL-SCNC: 22 MMOL/L (ref 20–29)
CREAT SERPL-MCNC: 0.6 MG/DL (ref 0.57–1)
FERRITIN SERPL-MCNC: 168 NG/ML (ref 15–150)
GLOBULIN SER CALC-MCNC: 3.3 G/DL (ref 1.5–4.5)
GLUCOSE SERPL-MCNC: 82 MG/DL (ref 65–99)
IRON SATN MFR SERPL: 29 % (ref 15–55)
IRON SERPL-MCNC: 79 UG/DL (ref 27–159)
POTASSIUM SERPL-SCNC: 4.2 MMOL/L (ref 3.5–5.2)
PROT SERPL-MCNC: 7.6 G/DL (ref 6–8.5)
SODIUM SERPL-SCNC: 140 MMOL/L (ref 134–144)
TIBC SERPL-MCNC: 275 UG/DL (ref 250–450)
UIBC SERPL-MCNC: 196 UG/DL (ref 131–425)

## 2021-05-05 ENCOUNTER — HOSPITAL ENCOUNTER (EMERGENCY)
Age: 46
Discharge: HOME | End: 2021-05-05
Attending: EMERGENCY MEDICINE | Admitting: EMERGENCY MEDICINE
Payer: COMMERCIAL

## 2021-05-05 VITALS
RESPIRATION RATE: 18 BRPM | HEART RATE: 93 BPM | OXYGEN SATURATION: 100 % | DIASTOLIC BLOOD PRESSURE: 88 MMHG | SYSTOLIC BLOOD PRESSURE: 144 MMHG | TEMPERATURE: 97.4 F

## 2021-05-05 DIAGNOSIS — R22.0 LIP SWELLING: Primary | ICD-10-CM

## 2021-05-05 DIAGNOSIS — T78.40XA ALLERGIC REACTION, INITIAL ENCOUNTER: ICD-10-CM

## 2021-05-05 PROCEDURE — 74011250637 HC RX REV CODE- 250/637: Performed by: EMERGENCY MEDICINE

## 2021-05-05 PROCEDURE — 74011636637 HC RX REV CODE- 636/637: Performed by: EMERGENCY MEDICINE

## 2021-05-05 PROCEDURE — 99283 EMERGENCY DEPT VISIT LOW MDM: CPT

## 2021-05-05 RX ORDER — CETIRIZINE HCL 10 MG
10 TABLET ORAL DAILY
Qty: 10 TAB | Refills: 0 | Status: SHIPPED | OUTPATIENT
Start: 2021-05-05

## 2021-05-05 RX ORDER — FAMOTIDINE 20 MG/1
20 TABLET, FILM COATED ORAL
Status: COMPLETED | OUTPATIENT
Start: 2021-05-05 | End: 2021-05-05

## 2021-05-05 RX ORDER — PREDNISONE 20 MG/1
40 TABLET ORAL DAILY
Qty: 8 TAB | Refills: 0 | Status: SHIPPED | OUTPATIENT
Start: 2021-05-05 | End: 2021-05-09

## 2021-05-05 RX ORDER — PREDNISONE 20 MG/1
60 TABLET ORAL
Status: COMPLETED | OUTPATIENT
Start: 2021-05-05 | End: 2021-05-05

## 2021-05-05 RX ORDER — FAMOTIDINE 20 MG/1
20 TABLET, FILM COATED ORAL 2 TIMES DAILY
Qty: 10 TAB | Refills: 0 | Status: SHIPPED | OUTPATIENT
Start: 2021-05-05 | End: 2021-05-10

## 2021-05-05 RX ORDER — CETIRIZINE HCL 10 MG
10 TABLET ORAL
Status: COMPLETED | OUTPATIENT
Start: 2021-05-05 | End: 2021-05-05

## 2021-05-05 RX ADMIN — PREDNISONE 60 MG: 20 TABLET ORAL at 10:59

## 2021-05-05 RX ADMIN — FAMOTIDINE 20 MG: 20 TABLET, FILM COATED ORAL at 10:59

## 2021-05-05 RX ADMIN — CETIRIZINE HYDROCHLORIDE 10 MG: 10 TABLET, FILM COATED ORAL at 10:59

## 2021-05-05 NOTE — LETTER
NOTIFICATION RETURN TO WORK / SCHOOL 
 
5/5/2021 11:09 AM 
 
Ms. Fabrice Bennett [address] [place] [id number] To Whom It May Concern: Fabrice Bennett is currently under the care of [facility]. She will return to work/school on: 5/8/2021 If there are questions or concerns please have the patient contact our office. Sincerely, Devaughn Partida NP

## 2021-05-05 NOTE — ED TRIAGE NOTES
Patient reports she woke up this morning with feeling like her lips were burning and throat is tight. She took 2 benadryl last night to help her sleep.  She only takes topamax and terbinafine

## 2021-05-05 NOTE — ED PROVIDER NOTES
HPI   Patient is a 47-year-old female with past medical history significant for anemia, Crohn's disease in remission, GERD, and headaches who presents to the ED reporting that she awoke this morning with swollen lips and feeling like the left side of her tongue/throat was sore. She was started on Topamax about 10 days ago but has not had any similar reaction. Denies any fever, difficulty breathing, difficulty swallowing, SOB,chest pain or abdominal pain Denies any nausea, vomiting or diarrhea. Pt. Reports taking benadryl last evening to help her sleep without relief. She denies any new foods, medications, lotions, detergents or other known allergens. Pt states that she was recently evaluated by her PCP for autoimmune diseases due to unexplained multi joint swelling. She completed a taper of prednisone about a week ago.   Past Medical History:   Diagnosis Date    Anemia NEC 2008    iron transfusion by Dr. Merari Smith Crohn disease ([facility] [address]     GERD (gastroesophageal reflux disease)     Headache(784.0)        Past Surgical History:   Procedure Laterality Date    HX OTHER SURGICAL      Ablation gyn    HX TUBAL LIGATION  9/02         Family History:   Problem Relation Age of Onset    Diabetes Paternal Grandmother     No Known Problems Mother     No Known Problems Father        Social History     Socioeconomic History    Marital status:      Spouse name: Not on file    Number of children: Not on file    Years of education: Not on file    Highest education level: Not on file   Occupational History    Not on file   Social Needs    Financial resource strain: Not on file    Food insecurity     Worry: Not on file     Inability: Not on file    Transportation needs     Medical: Not on file     Non-medical: Not on file   Tobacco Use    Smoking status: Never Smoker    Smokeless tobacco: Never Used   Substance and Sexual Activity    Alcohol use: Not Currently     Comment: rare social    Drug use: No    Sexual activity: Yes     Birth control/protection: Surgical   Lifestyle    Physical activity     Days per week: Not on file     Minutes per session: Not on file    Stress: Not on file   Relationships    Social connections     Talks on phone: Not on file     Gets together: Not on file     Attends Jain service: Not on file     Active member of club or organization: Not on file     Attends meetings of clubs or organizations: Not on file     Relationship status: Not on file    Intimate partner violence     Fear of current or ex partner: Not on file     Emotionally abused: Not on file     Physically abused: Not on file     Forced sexual activity: Not on file   Other Topics Concern    Not on file   Social History Narrative    Not on file         ALLERGIES: Beef containing products, Clindamycin, Flagyl [metronidazole], and Pineapple    Review of Systems   Constitutional: Negative for activity change, appetite change, fever and unexpected weight change. HENT: Positive for facial swelling and sore throat. Negative for congestion, trouble swallowing and voice change. Eyes: Positive for visual disturbance. Respiratory: Negative for cough and shortness of breath. Cardiovascular: Negative for chest pain, palpitations and leg swelling. Gastrointestinal: Negative for abdominal pain, constipation, nausea and vomiting. Endocrine: Negative for polyuria. Genitourinary: Negative for dysuria. Musculoskeletal: Negative for back pain and neck pain. Skin: Negative for rash. Neurological: Negative for dizziness, light-headedness, numbness and headaches. All other systems reviewed and are negative. Vitals:    05/05/21 1015   BP: (!) 144/88   Pulse: 93   Resp: 18   Temp: 97.4 °F (36.3 °C)   SpO2: 100%            Physical Exam  Vitals signs and nursing note reviewed. Constitutional:       General: She is not in acute distress. Appearance: Normal appearance. She is normal weight.  She is not toxic-appearing or diaphoretic. Comments: Black female; non smoker; ; RN   HENT:      Head: Normocephalic. Nose: Nose normal.      Mouth/Throat:      Mouth: Mucous membranes are moist.      Pharynx: Posterior oropharyngeal erythema present. Comments: Lower lip swelling; mild ridging noted on the edge of her tongue  Neck:      Musculoskeletal: Normal range of motion and neck supple. Cardiovascular:      Rate and Rhythm: Normal rate and regular rhythm. Pulmonary:      Effort: Pulmonary effort is normal.      Breath sounds: Normal breath sounds. Abdominal:      Palpations: Abdomen is soft. Tenderness: There is no abdominal tenderness. There is no guarding or rebound. Musculoskeletal: Normal range of motion. Right lower leg: No edema. Left lower leg: No edema. Lymphadenopathy:      Cervical: No cervical adenopathy. Skin:     General: Skin is warm and dry. Findings: No rash. Neurological:      General: No focal deficit present. Mental Status: She is alert and oriented to person, place, and time. Psychiatric:         Mood and Affect: Mood normal.         Behavior: Behavior normal.          MDM       Procedures      Plan to treat with short course of prednisone Zyrtec and Pepcid. Patient was given initial doses in the ED and reports that she is feeling better already. Encourage close follow-up with PCP and/or allergist if symptoms persist.      Patient's results and plan of care have been reviewed with her. Patient has verbally conveyed her understanding and agreement of her signs, symptoms, diagnosis, treatment and prognosis and additionally agrees to follow up as recommended or return to the Emergency Room should her condition change prior to follow-up.   Discharge instructions have also been provided to the patient with some educational information regarding her diagnosis as well a list of reasons why she would want to return to the ER prior to her follow-up appointment should her condition change. Genet Guadalupe NP

## 2022-04-02 ASSESSMENT — VISUAL ACUITY
OD_CC: 20/20
OU_SC: J1+
OS_CC: 20/20
OD_CC: 20/20
OS_CC: 20/20

## 2022-04-02 ASSESSMENT — TONOMETRY
OD_IOP_MMHG: 15
OD_IOP_MMHG: 16
OS_IOP_MMHG: 15
OS_IOP_MMHG: 17